# Patient Record
Sex: FEMALE | Race: WHITE | NOT HISPANIC OR LATINO | Employment: UNEMPLOYED | URBAN - METROPOLITAN AREA
[De-identification: names, ages, dates, MRNs, and addresses within clinical notes are randomized per-mention and may not be internally consistent; named-entity substitution may affect disease eponyms.]

---

## 2017-10-17 ENCOUNTER — HOSPITAL ENCOUNTER (EMERGENCY)
Facility: HOSPITAL | Age: 27
Discharge: HOME | End: 2017-10-17
Payer: MEDICAID

## 2017-10-17 VITALS
TEMPERATURE: 98 F | BODY MASS INDEX: 22.43 KG/M2 | SYSTOLIC BLOOD PRESSURE: 106 MMHG | RESPIRATION RATE: 18 BRPM | HEART RATE: 120 BPM | OXYGEN SATURATION: 96 % | WEIGHT: 148 LBS | DIASTOLIC BLOOD PRESSURE: 89 MMHG | HEIGHT: 68 IN

## 2017-10-17 DIAGNOSIS — S39.012A LUMBOSACRAL STRAIN, INITIAL ENCOUNTER: Primary | ICD-10-CM

## 2017-10-17 PROCEDURE — 99283 EMERGENCY DEPT VISIT LOW MDM: CPT

## 2017-10-17 RX ORDER — METHOCARBAMOL 500 MG/1
1000 TABLET, FILM COATED ORAL 3 TIMES DAILY
Qty: 30 TABLET | Refills: 0 | Status: SHIPPED | OUTPATIENT
Start: 2017-10-17 | End: 2017-10-22

## 2017-10-17 RX ORDER — PREDNISONE 20 MG/1
40 TABLET ORAL DAILY
Qty: 10 TABLET | Refills: 0 | Status: SHIPPED | OUTPATIENT
Start: 2017-10-17 | End: 2017-10-22

## 2017-10-17 RX ORDER — DICLOFENAC SODIUM 75 MG/1
75 TABLET, DELAYED RELEASE ORAL 2 TIMES DAILY
Qty: 20 TABLET | Refills: 0 | OUTPATIENT
Start: 2017-10-17 | End: 2020-10-10

## 2017-10-17 NOTE — ED PROVIDER NOTES
Encounter Date: 10/17/2017       History     Chief Complaint   Patient presents with    Back Pain     lower back pain x 8 weeks. denies nureological issues. workouts 6 days a week     The history is provided by the patient.   Back Pain    This is a recurrent problem. Illness onset: 3 months PTA. The problem occurs most days. The problem has been waxing and waning. The pain is associated with lifting heavy objects. The pain is present in the lumbar spine. The quality of the pain is described as aching. The pain is at a severity of 3/10. The symptoms are aggravated by twisting, bending and certain positions. Stiffness is present in the morning. Pertinent negatives include no chest pain, no fever, no numbness, no weight loss, no headaches, no abdominal pain, no abdominal swelling, no bowel incontinence, no perianal numbness, no bladder incontinence, no dysuria, no pelvic pain, no leg pain, no paresthesias, no paresis, no tingling and no weakness. She has tried nothing for the symptoms.     Review of patient's allergies indicates:  No Known Allergies  No past medical history on file.  No past surgical history on file.  No family history on file.  Social History   Substance Use Topics    Smoking status: Not on file    Smokeless tobacco: Not on file    Alcohol use Not on file     Review of Systems   Constitutional: Negative for chills, fever and weight loss.   HENT: Negative for sore throat.    Eyes: Negative for redness.   Respiratory: Negative for cough and shortness of breath.    Cardiovascular: Negative for chest pain.   Gastrointestinal: Negative for abdominal pain, bowel incontinence, diarrhea and nausea.   Endocrine: Negative for polydipsia and polyphagia.   Genitourinary: Negative for bladder incontinence, dysuria and pelvic pain.   Musculoskeletal: Positive for back pain. Negative for arthralgias and myalgias.   Skin: Negative for rash.   Neurological: Negative for tingling, weakness, numbness, headaches and  paresthesias.   Hematological: Does not bruise/bleed easily.   Psychiatric/Behavioral: The patient is not nervous/anxious.    All other systems reviewed and are negative.      Physical Exam     Initial Vitals [10/17/17 1020]   BP Pulse Resp Temp SpO2   106/89 (S) (!) 120 18 98 °F (36.7 °C) 96 %      MAP       94.67         Physical Exam    Nursing note and vitals reviewed.  Constitutional: Vital signs are normal. She appears well-developed and well-nourished. No distress.   HENT:   Head: Normocephalic and atraumatic.   Right Ear: External ear normal.   Left Ear: External ear normal.   Nose: Nose normal.   Mouth/Throat: Oropharynx is clear and moist.   Eyes: Conjunctivae, EOM and lids are normal. Pupils are equal, round, and reactive to light.   Neck: Normal range of motion and full passive range of motion without pain. Neck supple.   Cardiovascular: Normal rate, regular rhythm, S1 normal, S2 normal, normal heart sounds, intact distal pulses and normal pulses.   Pulse manually checked during exam: 100   Pulmonary/Chest: Breath sounds normal. No respiratory distress. She has no wheezes. She has no rales.   Abdominal: Soft. Normal appearance and bowel sounds are normal. She exhibits no distension. There is no tenderness.   Musculoskeletal:        Lumbar back: She exhibits decreased range of motion, tenderness, pain and spasm. She exhibits no bony tenderness, no swelling, no edema, no deformity, no laceration and normal pulse.   Lymphadenopathy:     She has no cervical adenopathy.   Neurological: She is alert and oriented to person, place, and time. She has normal strength. No cranial nerve deficit or sensory deficit. Coordination and gait normal.   Skin: Skin is warm, dry and intact.   Psychiatric: She has a normal mood and affect. Her speech is normal and behavior is normal. Judgment and thought content normal. Cognition and memory are normal.         ED Course   Procedures  Labs Reviewed - No data to display                        Attending Attestation:     Physician Attestation Statement for NP/PA:   I discussed this assessment and plan of this patient with the NP/PA, but I did not personally examine the patient. The face to face encounter was performed by the NP/PA.                  ED Course      Clinical Impression:   The encounter diagnosis was Lumbosacral strain, initial encounter.    Disposition:   Disposition: Discharged  Condition: Stable                        MALI Abdul  10/17/17 1036       Rodri Rodgers MD  10/18/17 0773

## 2019-11-10 ENCOUNTER — HOSPITAL ENCOUNTER (EMERGENCY)
Facility: HOSPITAL | Age: 29
Discharge: HOME | End: 2019-11-10
Attending: FAMILY MEDICINE
Payer: MEDICAID

## 2019-11-10 VITALS
DIASTOLIC BLOOD PRESSURE: 71 MMHG | WEIGHT: 164.88 LBS | BODY MASS INDEX: 24.42 KG/M2 | OXYGEN SATURATION: 100 % | SYSTOLIC BLOOD PRESSURE: 123 MMHG | HEART RATE: 84 BPM | TEMPERATURE: 98 F | HEIGHT: 69 IN | RESPIRATION RATE: 18 BRPM

## 2019-11-10 DIAGNOSIS — M54.9 BACK PAIN: ICD-10-CM

## 2019-11-10 DIAGNOSIS — S29.019A THORACIC MYOFASCIAL STRAIN, INITIAL ENCOUNTER: Primary | ICD-10-CM

## 2019-11-10 LAB — B-HCG UR QL: NEGATIVE

## 2019-11-10 PROCEDURE — 81025 URINE PREGNANCY TEST: CPT

## 2019-11-10 PROCEDURE — 99284 EMERGENCY DEPT VISIT MOD MDM: CPT | Mod: 25

## 2019-11-10 RX ORDER — CYCLOBENZAPRINE HCL 10 MG
10 TABLET ORAL 3 TIMES DAILY PRN
Qty: 15 TABLET | Refills: 0 | Status: SHIPPED | OUTPATIENT
Start: 2019-11-10 | End: 2019-11-15

## 2019-11-10 RX ORDER — TRAMADOL HYDROCHLORIDE 50 MG/1
50 TABLET ORAL EVERY 6 HOURS PRN
Qty: 12 TABLET | Refills: 0 | OUTPATIENT
Start: 2019-11-10 | End: 2020-10-10

## 2019-11-10 NOTE — ED PROVIDER NOTES
SCRIBE #1 NOTE: I, Monae Maldonado, am scribing for, and in the presence of, MALI Abdul. I have scribed the entire note.       History     Chief Complaint   Patient presents with    Back Pain     upper back in between shoulder blades and neck sudden onset at 10 am pt denies injury     Review of patient's allergies indicates:   Allergen Reactions    Zithromax [azithromycin] Itching         History of Present Illness     HPI    11/10/2019, 5:27 PM  History obtained from the patient      History of Present Illness: Lachelle Vogel is a 28 y.o. female patient who presents to the Emergency Department for evaluation of upper back pain which onset gradually several hours PTA. Pt denies lifting anything heavy or injury. Symptoms are constant and moderate in severity. No mitigating or exacerbating factors reported. Associated sxs include stress. Patient denies any CP, SOB, abd pain, n/v/d, HA, leg pain and all other sxs at this time. Prior Tx includes a muscle relaxer and an anti-inflammatory with no relief of sxs.. No further complaints or concerns at this time.         Arrival mode: Personal vehicle    PCP: Primary Doctor No        Past Medical History:  History reviewed. No pertinent past medical history.       Past Surgical History:  History reviewed. No pertinent past surgical history.         Family History:  History reviewed. No pertinent family history.       Social History:  Social History     Tobacco Use    Smoking status: unknown   Substance and Sexual Activity    Alcohol use: unknown    Drug use: unknown    Sexual activity: unknown        Review of Systems     Review of Systems   Constitutional: Negative for activity change, appetite change, chills, diaphoresis and fever.   HENT: Negative for congestion, drooling, ear pain, mouth sores, rhinorrhea, sinus pain, sore throat and trouble swallowing.    Eyes: Negative for pain and discharge.   Respiratory: Negative for cough, chest  tightness, shortness of breath, wheezing and stridor.    Cardiovascular: Negative for chest pain, palpitations and leg swelling.   Gastrointestinal: Negative for abdominal distention, abdominal pain, blood in stool, constipation, diarrhea, nausea and vomiting.   Genitourinary: Negative for difficulty urinating, dysuria, flank pain, frequency, hematuria and urgency.   Musculoskeletal: Positive for back pain (upper). Negative for arthralgias and myalgias.   Skin: Negative for pallor, rash and wound.   Neurological: Negative for dizziness, syncope, weakness, light-headedness, numbness and headaches.   Psychiatric/Behavioral:        (+) stress   All other systems reviewed and are negative.     Physical Exam     Initial Vitals [11/10/19 1722]   BP Pulse Resp Temp SpO2   123/71 84 18 97.8 °F (36.6 °C) 100 %      MAP       --          Physical Exam  Nursing Notes and Vital Signs Reviewed.  Constitutional: Patient is in no acute distress. Well-developed and well-nourished.  Head: Atraumatic. Normocephalic.  Eyes: PERRL. EOM intact. Conjunctivae are not pale. No scleral icterus.  ENT: Mucous membranes are moist. Oropharynx is clear and symmetric.    Neck: Supple. Full ROM. No lymphadenopathy.  Cardiovascular: Regular rate. Regular rhythm. No murmurs, rubs, or gallops. Distal pulses are 2+ and symmetric.  Pulmonary/Chest: No respiratory distress. Clear to auscultation bilaterally. No wheezing or rales.  Abdominal: Soft and non-distended.  There is no tenderness.  No rebound, guarding, or rigidity. Good bowel sounds.  Genitourinary: No CVA tenderness  Musculoskeletal: Moves all extremities. No obvious deformities. No edema. No calf tenderness.  Back: Midline thoracic tenderness. No midline deformities or step-offs of the T-spine or L-spine. Skin appears normal without abrasions or bruising. No erythema, induration, or fluctuance.   Skin: Warm and dry.  Neurological:  Alert, awake, and appropriate.  Normal speech.  No acute  "focal neurological deficits are appreciated.  Psychiatric: Normal affect. Good eye contact. Appropriate in content.     ED Course   Procedures  ED Vital Signs:  Vitals:    11/10/19 1722   BP: 123/71   Pulse: 84   Resp: 18   Temp: 97.8 °F (36.6 °C)   TempSrc: Oral   SpO2: 100%   Weight: 74.8 kg (164 lb 14.5 oz)   Height: 5' 9" (1.753 m)       Abnormal Lab Results:  Labs Reviewed   PREGNANCY TEST, URINE RAPID        All Lab Results:  None.    Imaging Results:  Imaging Results          X-Ray Thoracic Spine AP Lateral (Final result)  Result time 11/10/19 18:49:43    Final result by Nato Rondon MD (11/10/19 18:49:43)                 Impression:      1.  Negative for acute process.      Electronically signed by: Nato Rondon MD  Date:    11/10/2019  Time:    18:49             Narrative:    EXAMINATION:  XR THORACIC SPINE AP LATERAL    CLINICAL HISTORY:  Dorsalgia, unspecified    TECHNIQUE:  AP and lateral views of the thoracic spine were performed.    COMPARISON:  None    FINDINGS:  Mild convex-left curvature of the thoracic spine with marginal spondylosis.  Tortuous aorta.                                        The Emergency Provider reviewed the vital signs and test results, which are outlined above.     ED Discussion       7:07 PM: Reassessed pt at this time.  Pt states her condition has improved at this time. Discussed with pt all pertinent ED information and results. Discussed pt dx and plan of tx. Gave pt all f/u and return to the ED instructions. All questions and concerns were addressed at this time. Pt expresses understanding of information and instructions, and is comfortable with plan to discharge. Pt is stable for discharge.    I discussed with patient and/or family/caretaker that evaluation in the ED does not suggest any emergent or life threatening medical conditions requiring immediate intervention beyond what was provided in the ED, and I believe patient is safe for discharge.  Regardless, an " unremarkable evaluation in the ED does not preclude the development or presence of a serious of life threatening condition. As such, patient was instructed to return immediately for any worsening or change in current symptoms.         Medical Decision Making:   Clinical Tests:   Radiological Study: Ordered and Reviewed           ED Medication(s):  Medications - No data to display    New Prescriptions    No medications on file               Scribe Attestation:   Scribe #1: I performed the above scribed service and the documentation accurately describes the services I performed. I attest to the accuracy of the note.     Attending:   Physician Attestation Statement for Scribe #1: I, MALI Abdul, personally performed the services described in this documentation, as scribed by Monae Maldonado, in my presence, and it is both accurate and complete.           Clinical Impression       ICD-10-CM ICD-9-CM   1. Thoracic myofascial strain, initial encounter S29.019A 847.1   2. Back pain M54.9 724.5       Disposition:   Disposition: Discharged  Condition: Stable         MALI Abdul  11/11/19 121

## 2020-10-10 ENCOUNTER — HOSPITAL ENCOUNTER (EMERGENCY)
Facility: HOSPITAL | Age: 30
Discharge: HOME | End: 2020-10-10
Attending: EMERGENCY MEDICINE
Payer: MEDICAID

## 2020-10-10 VITALS
WEIGHT: 170.88 LBS | HEART RATE: 90 BPM | TEMPERATURE: 98 F | RESPIRATION RATE: 16 BRPM | SYSTOLIC BLOOD PRESSURE: 118 MMHG | OXYGEN SATURATION: 97 % | DIASTOLIC BLOOD PRESSURE: 62 MMHG | BODY MASS INDEX: 25.31 KG/M2 | HEIGHT: 69 IN

## 2020-10-10 DIAGNOSIS — R21 RASH AND NONSPECIFIC SKIN ERUPTION: Primary | ICD-10-CM

## 2020-10-10 PROCEDURE — 99283 EMERGENCY DEPT VISIT LOW MDM: CPT

## 2020-10-10 RX ORDER — MUPIROCIN 20 MG/G
OINTMENT TOPICAL 2 TIMES DAILY
Qty: 1 G | Refills: 0 | Status: SHIPPED | OUTPATIENT
Start: 2020-10-10

## 2020-10-10 NOTE — ED PROVIDER NOTES
SCRIBE #1 NOTE: I, Jonathan Polo, am scribing for, and in the presence of, Korina Olivo MD. I have scribed the entire note.      History      Chief Complaint   Patient presents with    Rash     rash to right arm, left arm and left leg. + itching, started x 1 week ago.rescued cats 1 week ago.       Review of patient's allergies indicates:   Allergen Reactions    Zithromax [azithromycin] Itching        HPI   HPI    10/10/2020, 9:44 AM   History obtained from the patient      History of Present Illness: Lachelle Vogel is a 29 y.o. female patient who presents to the Emergency Department for rash which onset gradually 1 week ago. Pt states she rescued a cat x1 week ago. Pt describes the rash as a raised bump with itching that began as 1 and now has 6. Symptoms are constant and moderate in severity. No mitigating or exacerbating factors reported. Associated sxs include itchiness to the site. Patient denies any fever, chills, increased warmth, drainage from sites, extremity weakness/numbness, and all other sxs at this time. Prior Tx includes hydrocortisone cream and neosporin with mild relief. Pt states a hot bath last night made her sxs worse. No further complaints or concerns at this time.         Arrival mode: Personal vehicle     PCP: ANAYA Greco       Past Medical History:  History reviewed. No pertinent medical history.     Past Surgical History:  History reviewed. No pertinent surgical history.       Family History:  History reviewed. No pertinent family history.     Social History:  Social History     Tobacco Use    Smoking status: Unknown   Substance and Sexual Activity    Alcohol use: Unknown    Drug use: Unkown    Sexual activity: Unknown       ROS   Review of Systems   Constitutional: Negative for chills and fever.   HENT: Negative for sore throat.    Respiratory: Negative for shortness of breath.    Cardiovascular: Negative for chest pain.   Gastrointestinal: Negative for  "nausea.   Genitourinary: Negative for dysuria.   Musculoskeletal: Negative for back pain.   Skin: Positive for rash.        (+) itchiness  (-) increased warmth or drainage from site   Neurological: Negative for weakness and numbness.   Hematological: Does not bruise/bleed easily.   All other systems reviewed and are negative.    Physical Exam      Initial Vitals [10/10/20 0938]   BP Pulse Resp Temp SpO2   118/62 90 16 98.3 °F (36.8 °C) 97 %      MAP       --          Physical Exam  Nursing Notes and Vital Signs Reviewed.  Constitutional: Patient is in no acute distress. Well-developed and well-nourished.  Head: Atraumatic. Normocephalic.  Eyes: PERRL. EOM intact. Conjunctivae are not pale. No scleral icterus.  ENT: Mucous membranes are moist. Oropharynx is clear and symmetric.    Neck: Supple. Full ROM. No lymphadenopathy.  Cardiovascular: Regular rate. Regular rhythm. No murmurs, rubs, or gallops. Distal pulses are 2+ and symmetric.  Pulmonary/Chest: No respiratory distress. Clear to auscultation bilaterally. No wheezing or rales.  Abdominal: Soft and non-distended.  There is no tenderness.  No rebound, guarding, or rigidity. Good bowel sounds.  Genitourinary: No CVA tenderness  Musculoskeletal: Moves all extremities. No obvious deformities. No edema. No calf tenderness.  Skin: Warm and dry. Little insect bits (2 on wrist and 1 under R arm). No signs of infection. Nothing on palms.   Neurological:  Alert, awake, and appropriate.  Normal speech.  No acute focal neurological deficits are appreciated.  Psychiatric: Normal affect. Good eye contact. Appropriate in content.    ED Course    Procedures  ED Vital Signs:  Vitals:    10/10/20 0938   BP: 118/62   Pulse: 90   Resp: 16   Temp: 98.3 °F (36.8 °C)   TempSrc: Oral   SpO2: 97%   Weight: 77.5 kg (170 lb 13.7 oz)   Height: 5' 9" (1.753 m)     Imaging Results:  Imaging Results    None                 The Emergency Provider reviewed the vital signs and test results, " which are outlined above.    ED Discussion     9:49 AM: Reassessed pt at this time.  Discussed with pt all pertinent ED information and results. Discussed pt dx and plan of tx. Gave pt all f/u and return to the ED instructions. All questions and concerns were addressed at this time. Pt expresses understanding of information and instructions, and is comfortable with plan to discharge. Pt is stable for discharge.    I discussed with patient and/or family/caretaker that evaluation in the ED does not suggest any emergent or life threatening medical conditions requiring immediate intervention beyond what was provided in the ED, and I believe patient is safe for discharge.  Regardless, an unremarkable evaluation in the ED does not preclude the development or presence of a serious of life threatening condition. As such, patient was instructed to return immediately for any worsening or change in current symptoms.           ED Medication(s):  Medications - No data to display    Discharge Medication List as of 10/10/2020  9:50 AM      START taking these medications    Details   mupirocin (BACTROBAN) 2 % ointment Apply topically 2 (two) times daily., Starting Sat 10/10/2020, Print              Medication List      START taking these medications    mupirocin 2 % ointment  Commonly known as: BACTROBAN  Apply topically 2 (two) times daily.        STOP taking these medications    diclofenac 75 MG EC tablet  Commonly known as: VOLTAREN     traMADoL 50 mg tablet  Commonly known as: ULTRAM           Where to Get Your Medications      You can get these medications from any pharmacy    Bring a paper prescription for each of these medications  · mupirocin 2 % ointment         Follow-up Information     ANAYA Greco. Schedule an appointment as soon as possible for a visit in 2 days.    Specialty: Family Medicine  Contact information:  [address]  [place] [address] [phone]                     Medical Decision Making               Scribe Attestation:   Scribe #1: I performed the above scribed service and the documentation accurately describes the services I performed. I attest to the accuracy of the note.    Attending:   Physician Attestation Statement for Scribe #1: I, Korina Olivo MD, personally performed the services described in this documentation, as scribed by Jonathan Polo, in my presence, and it is both accurate and complete.          Clinical Impression       ICD-10-CM ICD-9-CM   1. Rash and nonspecific skin eruption  R21 782.1       Disposition:   Disposition: Discharged  Condition: Stable         Korina Olivo MD  10/10/20 1128

## 2021-05-10 ENCOUNTER — PATIENT MESSAGE (OUTPATIENT)
Dept: RESEARCH | Facility: HOSPITAL | Age: 31
End: 2021-05-10

## 2021-12-13 ENCOUNTER — TELEPHONE (OUTPATIENT)
Dept: OBSTETRICS AND GYNECOLOGY | Facility: CLINIC | Age: 31
End: 2021-12-13
Payer: MEDICAID